# Patient Record
Sex: MALE | Race: WHITE | Employment: FULL TIME | URBAN - NONMETROPOLITAN AREA
[De-identification: names, ages, dates, MRNs, and addresses within clinical notes are randomized per-mention and may not be internally consistent; named-entity substitution may affect disease eponyms.]

---

## 2021-05-01 ENCOUNTER — HOSPITAL ENCOUNTER (EMERGENCY)
Age: 26
Discharge: HOME | End: 2021-05-01
Attending: EMERGENCY MEDICINE
Payer: COMMERCIAL

## 2021-05-01 VITALS
SYSTOLIC BLOOD PRESSURE: 127 MMHG | RESPIRATION RATE: 15 BRPM | DIASTOLIC BLOOD PRESSURE: 72 MMHG | TEMPERATURE: 98.3 F | HEART RATE: 86 BPM | OXYGEN SATURATION: 99 %

## 2021-05-01 DIAGNOSIS — R45.851 SUICIDE IDEATION: ICD-10-CM

## 2021-05-01 DIAGNOSIS — F10.929 ACUTE ALCOHOLIC INTOXICATION WITH COMPLICATION (HCC): Primary | ICD-10-CM

## 2021-05-01 LAB
ACETAMINOPHEN LEVEL: < 5 UG/ML (ref 0–20)
ALBUMIN SERPL-MCNC: 5.2 G/DL (ref 3.5–5.1)
ALP BLD-CCNC: 71 U/L (ref 38–126)
ALT SERPL-CCNC: 21 U/L (ref 11–66)
ANION GAP SERPL CALCULATED.3IONS-SCNC: 14 MEQ/L (ref 8–16)
AST SERPL-CCNC: 26 U/L (ref 5–40)
BASOPHILS # BLD: 0.4 %
BASOPHILS ABSOLUTE: 0 THOU/MM3 (ref 0–0.1)
BILIRUB SERPL-MCNC: 0.3 MG/DL (ref 0.3–1.2)
BUN BLDV-MCNC: 12 MG/DL (ref 7–22)
CALCIUM SERPL-MCNC: 8.9 MG/DL (ref 8.5–10.5)
CHLORIDE BLD-SCNC: 104 MEQ/L (ref 98–111)
CO2: 26 MEQ/L (ref 23–33)
CREAT SERPL-MCNC: 1 MG/DL (ref 0.4–1.2)
EOSINOPHIL # BLD: 0.3 %
EOSINOPHILS ABSOLUTE: 0 THOU/MM3 (ref 0–0.4)
ERYTHROCYTE [DISTWIDTH] IN BLOOD BY AUTOMATED COUNT: 12.7 % (ref 11.5–14.5)
ERYTHROCYTE [DISTWIDTH] IN BLOOD BY AUTOMATED COUNT: 43.2 FL (ref 35–45)
ETHYL ALCOHOL, SERUM: 0.04 %
ETHYL ALCOHOL, SERUM: 0.14 %
ETHYL ALCOHOL, SERUM: 0.34 %
GFR SERPL CREATININE-BSD FRML MDRD: > 90 ML/MIN/1.73M2
GLUCOSE BLD-MCNC: 130 MG/DL (ref 70–108)
HCT VFR BLD CALC: 44.8 % (ref 42–52)
HEMOGLOBIN: 15.1 GM/DL (ref 14–18)
IMMATURE GRANS (ABS): 0.03 THOU/MM3 (ref 0–0.07)
IMMATURE GRANULOCYTES: 0.3 %
LYMPHOCYTES # BLD: 36.2 %
LYMPHOCYTES ABSOLUTE: 3.4 THOU/MM3 (ref 1–4.8)
MCH RBC QN AUTO: 31.2 PG (ref 26–33)
MCHC RBC AUTO-ENTMCNC: 33.7 GM/DL (ref 32.2–35.5)
MCV RBC AUTO: 92.6 FL (ref 80–94)
MONOCYTES # BLD: 6.7 %
MONOCYTES ABSOLUTE: 0.6 THOU/MM3 (ref 0.4–1.3)
NUCLEATED RED BLOOD CELLS: 0 /100 WBC
OSMOLALITY CALCULATION: 288.3 MOSMOL/KG (ref 275–300)
PLATELET # BLD: 266 THOU/MM3 (ref 130–400)
PMV BLD AUTO: 11.2 FL (ref 9.4–12.4)
POTASSIUM REFLEX MAGNESIUM: 3.6 MEQ/L (ref 3.5–5.2)
RBC # BLD: 4.84 MILL/MM3 (ref 4.7–6.1)
SALICYLATE, SERUM: < 0.3 MG/DL (ref 2–10)
SEG NEUTROPHILS: 56.1 %
SEGMENTED NEUTROPHILS ABSOLUTE COUNT: 5.3 THOU/MM3 (ref 1.8–7.7)
SODIUM BLD-SCNC: 144 MEQ/L (ref 135–145)
TOTAL PROTEIN: 8.3 G/DL (ref 6.1–8)
TSH SERPL DL<=0.05 MIU/L-ACNC: 2.08 UIU/ML (ref 0.4–4.2)
WBC # BLD: 9.4 THOU/MM3 (ref 4.8–10.8)

## 2021-05-01 PROCEDURE — 36415 COLL VENOUS BLD VENIPUNCTURE: CPT

## 2021-05-01 PROCEDURE — 6360000002 HC RX W HCPCS

## 2021-05-01 PROCEDURE — 96372 THER/PROPH/DIAG INJ SC/IM: CPT

## 2021-05-01 PROCEDURE — 80143 DRUG ASSAY ACETAMINOPHEN: CPT

## 2021-05-01 PROCEDURE — 99285 EMERGENCY DEPT VISIT HI MDM: CPT

## 2021-05-01 PROCEDURE — 84443 ASSAY THYROID STIM HORMONE: CPT

## 2021-05-01 PROCEDURE — 82077 ASSAY SPEC XCP UR&BREATH IA: CPT

## 2021-05-01 PROCEDURE — 85025 COMPLETE CBC W/AUTO DIFF WBC: CPT

## 2021-05-01 PROCEDURE — 80179 DRUG ASSAY SALICYLATE: CPT

## 2021-05-01 PROCEDURE — 80053 COMPREHEN METABOLIC PANEL: CPT

## 2021-05-01 RX ORDER — LORAZEPAM 2 MG/ML
2 INJECTION INTRAMUSCULAR ONCE
Status: COMPLETED | OUTPATIENT
Start: 2021-05-01 | End: 2021-05-01

## 2021-05-01 RX ORDER — LORAZEPAM 2 MG/ML
INJECTION INTRAMUSCULAR
Status: COMPLETED
Start: 2021-05-01 | End: 2021-05-01

## 2021-05-01 RX ORDER — HALOPERIDOL 5 MG/ML
5 INJECTION INTRAMUSCULAR ONCE
Status: COMPLETED | OUTPATIENT
Start: 2021-05-01 | End: 2021-05-01

## 2021-05-01 RX ORDER — HALOPERIDOL 5 MG/ML
INJECTION INTRAMUSCULAR
Status: COMPLETED
Start: 2021-05-01 | End: 2021-05-01

## 2021-05-01 RX ADMIN — LORAZEPAM 2 MG: 2 INJECTION INTRAMUSCULAR; INTRAVENOUS at 01:05

## 2021-05-01 RX ADMIN — HALOPERIDOL 5 MG: 5 INJECTION INTRAMUSCULAR at 01:05

## 2021-05-01 RX ADMIN — LORAZEPAM 2 MG: 2 INJECTION INTRAMUSCULAR at 01:05

## 2021-05-01 RX ADMIN — HALOPERIDOL LACTATE 5 MG: 5 INJECTION INTRAMUSCULAR at 01:05

## 2021-05-01 ASSESSMENT — ENCOUNTER SYMPTOMS
WHEEZING: 0
ABDOMINAL PAIN: 0
CHEST TIGHTNESS: 0
COUGH: 0
EYE PAIN: 0
SORE THROAT: 0
EYE DISCHARGE: 0
NAUSEA: 0
RHINORRHEA: 0
SHORTNESS OF BREATH: 0
PHOTOPHOBIA: 0
STRIDOR: 0
BACK PAIN: 0
EYE REDNESS: 0
VOMITING: 0
ABDOMINAL DISTENTION: 0
EYE ITCHING: 0
DIARRHEA: 0
CONSTIPATION: 0

## 2021-05-01 NOTE — ED NOTES
Pt resting in bed with eyes closed, respirations unlabored. Call light in reach.      Scott Zhao RN  05/01/21 3414

## 2021-05-01 NOTE — PROGRESS NOTES
BAL Re-Assess:   Status & Exam & Behavior Support Service Tabs      Present Suicidal Behavior:      Verbal: Denies    Plan: Denies    Current Suicide Risk: Low, Moderate or High: Low    Present Homicidal Behavior:    Verbal: Denies    Plan: Denies      Psychosis:    Hallucinations:Denies    Delusions:Denies      Clinical Re-Assessment Summary including Current Mental State of Patient: Pt reports he was feeling a little depressed and drank to much alcohol. Pt reports because I decided to drink to much it caused all of this. Pt reports this stint taught him a lesson and he will not being doing that again.            Updated Level of Care Disposition:    Consulted with Dr. Fran Singh Discharge pt

## 2021-05-01 NOTE — ED TRIAGE NOTES
Patient to ED from house sitting by 's after sending a text to a coworker stating that he wanted to put a bullet in his head. Patient was house sitting a home when he drank a bottle of whisky. Patient denies suicidal thoughts. Pt under [facility] by police.

## 2021-05-01 NOTE — ED NOTES
Pt resting in bed in a supine position at this time. Patient respirations easy and unlabored.  Pt side rails up x2     Roseanne Villela RN  05/01/21 2719

## 2021-05-01 NOTE — ED NOTES
Pt resting in bed in a semi fowlers position. Patient denies needs at this time. Patient respirations easy and unlabored.       Robert Chow RN  05/01/21 6548

## 2021-05-01 NOTE — ED NOTES
Upon first contact pt is resting on cot. Lights off for comfort. [place] police providing continuous monitoring.       Miladis Lam RN  05/01/21 0213

## 2021-05-01 NOTE — ED PROVIDER NOTES
[address] ENCOUNTER      PATIENT NAME: Jose Miranda  MRN: [id number]  : 1995  SPANN: 2021  PROVIDER: iVktoria West MD      CHIEF COMPLAINT       Chief Complaint   Patient presents with    Alcohol Intoxication    Suicidal       Nurses Notes reviewed and I agree except as noted in the HPI. HISTORY OF PRESENT ILLNESS    Jose Miranda is a 32 y.o. male who presents to Emergency Department with Alcohol Intoxication and Suicidal    Patient presents to ED with alcohol intoxication and suicide ideation. Patient sent text his friend that he wanted to put a bullet in his head. Patient was agitated and noncooperative. He had to be medicated to calm him down. He was brought in by [facility], he was reported to have a bottle of whiskey tonight. He currently lynn suicide plan. Under [facility] by police. This HPI was provided by the police and nurse. REVIEW OF SYSTEMS     Review of Systems   Constitutional: Negative for activity change, appetite change, chills, fatigue, fever and unexpected weight change. HENT: Negative for congestion, ear discharge, ear pain, hearing loss, nosebleeds, rhinorrhea and sore throat. Eyes: Negative for photophobia, pain, discharge, redness and itching. Respiratory: Negative for cough, chest tightness, shortness of breath, wheezing and stridor. Cardiovascular: Negative for chest pain, palpitations and leg swelling. Gastrointestinal: Negative for abdominal distention, abdominal pain, constipation, diarrhea, nausea and vomiting. Endocrine: Negative for cold intolerance, heat intolerance, polydipsia and polyphagia. Genitourinary: Negative for dysuria, flank pain, frequency and hematuria. Musculoskeletal: Negative for arthralgias, back pain, gait problem, myalgias, neck pain and neck stiffness. Skin: Negative for pallor, rash and wound.    Allergic/Immunologic: Negative for environmental allergies and food allergies. Neurological: Negative for dizziness, tremors, syncope, weakness and headaches. Psychiatric/Behavioral: Positive for agitation, dysphoric mood and suicidal ideas. Negative for behavioral problems, confusion, self-injury and sleep disturbance. The patient is nervous/anxious and is hyperactive. PAST MEDICAL HISTORY   History reviewed. No pertinent past medical history. SURGICAL HISTORY     History reviewed. No pertinent surgical history. CURRENT MEDICATIONS       Previous Medications    No medications on file       ALLERGIES     Patient has no known allergies. FAMILY HISTORY     has no family status information on file. family history is not on file. SOCIAL HISTORY      reports current alcohol use. PHYSICAL EXAM     INITIAL VITALS:    temperature is 98.3 °F (36.8 °C). His blood pressure is 140/78 (abnormal) and his pulse is 110. His respiration is 16 and oxygen saturation is 98%. Physical Exam  Vitals signs and nursing note reviewed. Constitutional:       Appearance: He is well-developed. He is not diaphoretic. HENT:      Head: Normocephalic and atraumatic. Nose: Nose normal.   Eyes:      General: No scleral icterus. Right eye: No discharge. Left eye: No discharge. Conjunctiva/sclera: Conjunctivae normal.      Pupils: Pupils are equal, round, and reactive to light. Neck:      Musculoskeletal: Normal range of motion and neck supple. Vascular: No JVD. Trachea: No tracheal deviation. Cardiovascular:      Rate and Rhythm: Regular rhythm. Tachycardia present. Heart sounds: Normal heart sounds. No murmur. No friction rub. No gallop. Pulmonary:      Effort: Pulmonary effort is normal. No respiratory distress. Breath sounds: Normal breath sounds. No stridor. No wheezing or rales. Chest:      Chest wall: No tenderness. Abdominal:      General: Bowel sounds are normal. There is no distension. Palpations: Abdomen is soft. There is no mass. Tenderness: There is no abdominal tenderness. There is no guarding or rebound. Hernia: No hernia is present. Musculoskeletal:         General: No tenderness or deformity. Lymphadenopathy:      Cervical: No cervical adenopathy. Skin:     General: Skin is warm and dry. Capillary Refill: Capillary refill takes less than 2 seconds. Coloration: Skin is not pale. Findings: No erythema or rash. Neurological:      Mental Status: He is alert and oriented to person, place, and time. Cranial Nerves: No cranial nerve deficit. Sensory: No sensory deficit. Motor: No abnormal muscle tone. Coordination: Coordination normal.      Deep Tendon Reflexes: Reflexes normal.         MEDICAL DEDISION MAKINGS:   1:02 AM: Patient is seen and evaluated in a timely fashion. ED nurse's documentations are reviewed. DIFFERENTIAL DIAGNOSIS:  Anxiety, depression, alcohol intoxication, substance abuse, substance-induced mood disorder    EKG:    Interpreted by ED physician  None    LAB RESULTS:  Results for orders placed or performed during the hospital encounter of 05/01/21   CBC auto differential   Result Value Ref Range    WBC 9.4 4.8 - 10.8 thou/mm3    RBC 4.84 4.70 - 6.10 mill/mm3    Hemoglobin 15.1 14.0 - 18.0 gm/dl    Hematocrit 44.8 42.0 - 52.0 %    MCV 92.6 80.0 - 94.0 fL    MCH 31.2 26.0 - 33.0 pg    MCHC 33.7 32.2 - 35.5 gm/dl    RDW-CV 12.7 11.5 - 14.5 %    RDW-SD 43.2 35.0 - 45.0 fL    Platelets 739 629 - 338 thou/mm3    MPV 11.2 9.4 - 12.4 fL    Seg Neutrophils 56.1 %    Lymphocytes 36.2 %    Monocytes 6.7 %    Eosinophils 0.3 %    Basophils 0.4 %    Immature Granulocytes 0.3 %    Segs Absolute 5.3 1 - 7 thou/mm3    Lymphocytes Absolute 3.4 1.0 - 4.8 thou/mm3    Monocytes Absolute 0.6 0.4 - 1.3 thou/mm3    Eosinophils Absolute 0.0 0.0 - 0.4 thou/mm3    Basophils Absolute 0.0 0.0 - 0.1 thou/mm3    Immature Grans (Abs) 0.03 0.00 - 0.07 thou/mm3    nRBC 0 /100 wbc Comprehensive Metabolic Panel w/ Reflex to MG   Result Value Ref Range    Glucose 130 (H) 70 - 108 mg/dL    CREATININE 1.0 0.4 - 1.2 mg/dL    BUN 12 7 - 22 mg/dL    Sodium 144 135 - 145 meq/L    Potassium reflex Magnesium 3.6 3.5 - 5.2 meq/L    Chloride 104 98 - 111 meq/L    CO2 26 23 - 33 meq/L    Calcium 8.9 8.5 - 10.5 mg/dL    AST 26 5 - 40 U/L    Alkaline Phosphatase 71 38 - 126 U/L    Total Protein 8.3 (H) 6.1 - 8.0 g/dL    Albumin 5.2 (H) 3.5 - 5.1 g/dL    Total Bilirubin 0.3 0.3 - 1.2 mg/dL    ALT 21 11 - 66 U/L   Ethanol   Result Value Ref Range    ETHYL ALCOHOL, SERUM 4.81 4.32 %   Salicylate Level   Result Value Ref Range    Salicylate, Serum < 0.3 (L) 2.0 - 10.0 mg/dL   Acetaminophen level   Result Value Ref Range    Acetaminophen Level < 5.0 0.0 - 20.0 ug/mL   TSH with Reflex   Result Value Ref Range    TSH 2.080 0.400 - 4.200 uIU/mL   Anion Gap   Result Value Ref Range    Anion Gap 14.0 8.0 - 16.0 meq/L   Glomerular Filtration Rate, Estimated   Result Value Ref Range    Est, Glom Filt Rate >90 ml/min/1.73m2   Osmolality   Result Value Ref Range    Osmolality Calc 288. 3 275.0 - 300.0 mOsmol/kg       RADIOLOGY  No orders to display       RATIONALE:    Medications   LORazepam (ATIVAN) injection 2 mg (2 mg Intramuscular Given 5/1/21 0105)   haloperidol lactate (HALDOL) injection 5 mg (5 mg Intramuscular Given 5/1/21 0105)       Labs Ordered:    CBC WITH AUTO DIFFERENTIAL   COMPREHENSIVE METABOLIC PANEL W/ REFLEX TO MG FOR LOW K   ETHANOL   URINE RT REFLEX TO CULTURE   URINE DRUG SCREEN   SALICYLATE LEVEL   ACETAMINOPHEN LEVEL   TSH WITH REFLEX     ETOH recheck at 11 AM.      Signed out to next shift physician Dr. Grace Hunter. CRITICAL CARE:   None    CONSULTS:  NOEMI    PROCEDURES:  None    RE-EXAMINATION AND RE-EVALUATION   Stable and feeling better.      VITALS IN ED  Vitals:    05/01/21 0035 05/01/21 0218 05/01/21 0522   BP: (!) 140/78     Pulse: 110     Resp: 22 18 16   Temp: 98.3 °F (36.8 °C)     SpO2: 98%         FINAL IMPRESSION      1. Acute alcoholic intoxication with complication ([facility] [address]    2. Suicide ideation          DISPOSITION/PLAN   Signed out. PATIENT REFERRED TO:  No follow-up provider specified.     DISCHARGE MEDICATIONS:  New Prescriptions    No medications on file       (Please note that portions of this note were completed with a voice recognition program.  Efforts were made to edit the dictations but occasionally words aremis-transcribed.)    MD Kathy Zhou MD  05/01/21 2427

## 2021-05-01 NOTE — ED NOTES
Bed: 021A  Expected date:   Expected time:   Means of arrival:   Comments:  crisis     Naldo Alejandro RN  05/01/21 7024

## 2021-05-01 NOTE — PROGRESS NOTES
7:00am Clinician was provided handoff. Pt was resting in bed with covers on.     8:50am Pt resting in bed with covers on     9:10am RN is in the room and woke pt up to complete vitals.      10:35am Pt is resting in bed with covers on.     11:34am Pt was provided a safe tray by RN

## 2021-05-01 NOTE — PROGRESS NOTES
Provisional Diagnosis:    ALIDA    Risk, Psychosocial and Contextual Factors:  ALIDA    Current MH Treatment: ALIDA      Present Suicidal Behavior:      Verbal:  ALIDA        Attempt: ALIDA    Access to Weapons:  ALIDA    Current Suicide Risk: Low, Moderate or High:    High (EMC)    Past Suicidal Behavior:        Verbal:   ALIDA    Attempt: ALIDA    Self-Injurious/Self-Mutilation: ALIDA    Traumatic Event Within Past 2 Weeks:   ALIDA    Current Abuse: ALIDA    Legal:  ALIDA    Violence: ALIDA    Protective Factors:  ALIDA    Housing:    ALIDA    CPAP/Oxygen/Ambulation Difficulties:     Basic Vital Signs Normal?: Check with Patients Nurse prior to Calling Psychiatry    Critical Labs?: Check with Patients Nurse prior to Calling Psychiatry    Clinical Summary:      Patient is a 32year old male who presents to the ED on [facility] via reporting. Per [facility], deputies were dispatched for a wellness check on patient due to patient text messaging friend that he 'wants to piut a bullet in his head'. Deputies arrived on scene and patient was extremely drunk on whiskey and gave physical que that he was in a manic drunk state. Patient was all over with his statements and would not cooperate. Patient did yell at deputies that he wants us to kill him. It is my belief that patient would benefit from mental help and medical help for alcohol. Patient ALIDA assess at this time. Per nurse note, upon arrival patient acting aggressively trying to exit safe room and yelling at police. Patient was medicated shortly after arrival. When this clinician attempted to assess patient, patient would not respond to questions. Patient sleeping at this time. Level of Care Disposition:      Patient BAL . 34. Patient to be re-assessed once clinically sober and alert and oriented.

## 2021-05-01 NOTE — ED NOTES
[phone] Perez- Father. Pt gave verbal consent to updated father on plan of care.       Pedro Varela RN  05/01/21 CIELO Santos  05/01/21 3712

## 2021-05-01 NOTE — ED PROVIDER NOTES
Physician Note:    Patient was seen by Dr. Padma Vasquez initially, signed out to Dr. Morgan Montez and then to me as the patient had a pending evaluation and alcohol level. I have personally performed and participated in the history, exam and medical decision making and agree with all pertinent clinical information. I have also reviewed and agree with the past medical, family and social history unless otherwise noted. Patient presented to the emergency room due to alcohol intoxication and suicidal ideation. Patient came in here last night was evaluated, alcohol has been redrawn from time to time and was monitored. When I am seeing the patient the patient's alcohol level is 0.04, denies any suicidal ideation no homicidal ideation, no delusion or hallucination, she is feeling well, no shortness of breath or chest pain abdominal pain back pain and wants to go home. Patient was evaluated by the behavioral access team and was cleared to go home and be followed as an outpatient    Physical examination showed her to auscultation, heart regular rate and rhythm abdomen soft depressible nontender extremities no edema    Evaluation: Agree above , seen the patient and discussed the diagnosis and treatment plans     FINAL IMPRESSION       1. Acute alcoholic intoxication with complication ([facility] [address]    2. Suicide ideation            DISPOSITION/PLAN  PATIENT REFERRED TO:  [address] [place] [address],[address]  [address] [address] [facility] [address] [facility]  Schedule an appointment as soon as possible for a visit in 2 days      DISCHARGE MEDICATIONS:  New Prescriptions    No medications on file         Car Jennings MD      Emergency room physician     Car Jennings MD  05/05/21 2360

## 2021-05-04 ENCOUNTER — OFFICE VISIT (OUTPATIENT)
Dept: FAMILY MEDICINE CLINIC | Age: 26
End: 2021-05-04
Payer: COMMERCIAL

## 2021-05-04 VITALS
OXYGEN SATURATION: 98 % | HEART RATE: 130 BPM | WEIGHT: 157.4 LBS | HEIGHT: 68 IN | SYSTOLIC BLOOD PRESSURE: 120 MMHG | BODY MASS INDEX: 23.86 KG/M2 | TEMPERATURE: 98.2 F | DIASTOLIC BLOOD PRESSURE: 86 MMHG

## 2021-05-04 DIAGNOSIS — F41.9 ANXIETY: Primary | ICD-10-CM

## 2021-05-04 DIAGNOSIS — R00.0 TACHYCARDIA: ICD-10-CM

## 2021-05-04 DIAGNOSIS — F90.2 ATTENTION DEFICIT HYPERACTIVITY DISORDER (ADHD), COMBINED TYPE: ICD-10-CM

## 2021-05-04 PROCEDURE — 99213 OFFICE O/P EST LOW 20 MIN: CPT | Performed by: STUDENT IN AN ORGANIZED HEALTH CARE EDUCATION/TRAINING PROGRAM

## 2021-05-04 RX ORDER — BUPROPION HYDROCHLORIDE 150 MG/1
150 TABLET ORAL EVERY MORNING
Qty: 30 TABLET | Refills: 3 | Status: SHIPPED | OUTPATIENT
Start: 2021-05-04

## 2021-05-04 RX ORDER — DEXTROAMPHETAMINE SACCHARATE, AMPHETAMINE ASPARTATE, DEXTROAMPHETAMINE SULFATE AND AMPHETAMINE SULFATE 2.5; 2.5; 2.5; 2.5 MG/1; MG/1; MG/1; MG/1
TABLET ORAL
COMMUNITY
Start: 2021-04-07

## 2021-05-04 RX ORDER — DEXTROAMPHETAMINE SACCHARATE, AMPHETAMINE ASPARTATE MONOHYDRATE, DEXTROAMPHETAMINE SULFATE AND AMPHETAMINE SULFATE 5; 5; 5; 5 MG/1; MG/1; MG/1; MG/1
CAPSULE, EXTENDED RELEASE ORAL
COMMUNITY
Start: 2021-04-07

## 2021-05-04 SDOH — ECONOMIC STABILITY: FOOD INSECURITY: WITHIN THE PAST 12 MONTHS, THE FOOD YOU BOUGHT JUST DIDN'T LAST AND YOU DIDN'T HAVE MONEY TO GET MORE.: NEVER TRUE

## 2021-05-04 SDOH — HEALTH STABILITY: MENTAL HEALTH: HOW MANY STANDARD DRINKS CONTAINING ALCOHOL DO YOU HAVE ON A TYPICAL DAY?: NOT ASKED

## 2021-05-04 SDOH — ECONOMIC STABILITY: INCOME INSECURITY: HOW HARD IS IT FOR YOU TO PAY FOR THE VERY BASICS LIKE FOOD, HOUSING, MEDICAL CARE, AND HEATING?: NOT HARD AT ALL

## 2021-05-04 SDOH — ECONOMIC STABILITY: FOOD INSECURITY: WITHIN THE PAST 12 MONTHS, YOU WORRIED THAT YOUR FOOD WOULD RUN OUT BEFORE YOU GOT MONEY TO BUY MORE.: NEVER TRUE

## 2021-05-04 SDOH — ECONOMIC STABILITY: TRANSPORTATION INSECURITY
IN THE PAST 12 MONTHS, HAS LACK OF TRANSPORTATION KEPT YOU FROM MEETINGS, WORK, OR FROM GETTING THINGS NEEDED FOR DAILY LIVING?: NO

## 2021-05-04 SDOH — HEALTH STABILITY: MENTAL HEALTH: HOW OFTEN DO YOU HAVE A DRINK CONTAINING ALCOHOL?: NOT ASKED

## 2021-05-04 ASSESSMENT — ENCOUNTER SYMPTOMS
ABDOMINAL PAIN: 0
EYE PAIN: 0
SHORTNESS OF BREATH: 0
DIARRHEA: 0
TROUBLE SWALLOWING: 0
BLOOD IN STOOL: 0
COUGH: 0
CONSTIPATION: 0

## 2021-05-04 ASSESSMENT — PATIENT HEALTH QUESTIONNAIRE - PHQ9
SUM OF ALL RESPONSES TO PHQ QUESTIONS 1-9: 1
SUM OF ALL RESPONSES TO PHQ9 QUESTIONS 1 & 2: 1
1. LITTLE INTEREST OR PLEASURE IN DOING THINGS: 0
SUM OF ALL RESPONSES TO PHQ QUESTIONS 1-9: 1

## 2021-05-04 NOTE — PROGRESS NOTES
Health Maintenance Due   Topic Date Due    Hepatitis C screen  Never done Declined    Varicella vaccine (1 of 2 - 2-dose childhood series) Never done Declined    HPV vaccine (1 - Male 2-dose series) Never done Declined    HIV screen  Never done Declined    COVID-19 Vaccine (1) Never done Had 1st one    DTaP/Tdap/Td vaccine (1 - Tdap) Never done Declined

## 2021-05-04 NOTE — PROGRESS NOTES
Leonel Montejo is a 32 y.o. male who presents today for:  Chief Complaint   Patient presents with   [facility] ED Follow-up     [facility] ED 05/01/2021 Follow-up Acute Alcohol Intoxication and Suicidal Ideation     HPI:   59-year-old male with past medical history of ADHD on Adderall who is here for ED follow-up after acute alcohol intoxication. States that he has increased his alcohol intake since the pandemic started, has never had an issue with abuse of alcohol. Has never been unable to stop drinking. However, states that on this occasion he was unable to stop. He was evaluated in the ED, etoh level normalized, and he returned home. Reports stressors from work, and he moved back home with his parents in March of 2020. Denies any suicidal ideation. Denies history of depression, however expresses depressed mood since this event with accompanied feelings of guilt/shame. Does state that he has a history of anxiety. He is requesting a note stating that it is okay for him to go back to work. He has a counselor in [facility].      Objective:     Vitals:    05/04/21 1057 05/04/21 1106   BP: (!) 134/96 120/86   Site: Left Upper Arm Left Upper Arm   Position: Sitting Sitting   Cuff Size: Medium Adult Medium Adult   Pulse: 130    Temp: 98.2 °F (36.8 °C)    TempSrc: Oral    SpO2: 98%    Weight: 157 lb 6.4 oz (71.4 kg)    Height: 5' 8\" (1.727 m)        Lab Results   Component Value Date    WBC 9.4 05/01/2021    HGB 15.1 05/01/2021    HCT 44.8 05/01/2021    MCV 92.6 05/01/2021     05/01/2021     Lab Results   Component Value Date     05/01/2021    K 3.6 05/01/2021     05/01/2021    CO2 26 05/01/2021    BUN 12 05/01/2021    CREATININE 1.0 05/01/2021    GLUCOSE 130 (H) 05/01/2021    CALCIUM 8.9 05/01/2021    PROT 8.3 (H) 05/01/2021    LABALBU 5.2 (H) 05/01/2021    BILITOT 0.3 05/01/2021    ALKPHOS 71 05/01/2021    AST 26 05/01/2021    ALT 21 05/01/2021    LABGLOM >90 05/01/2021         Lab Results   Component Value and oriented to person, place, and time. Psychiatric:         Attention and Perception: Attention normal.         Mood and Affect: Mood is anxious. Behavior: Behavior normal.         Thought Content: Thought content normal.         Judgment: Judgment normal.         Social Needs   Food insecurity    Worry: Never true    Inability: Never true       Assessment / Plan:      Diagnosis Orders   1. Anxiety  buPROPion (WELLBUTRIN XL) 150 MG extended release tablet   2. Attention deficit hyperactivity disorder (ADHD), combined type     3. Tachycardia       Counseling regarding alcohol consumption. Begin Wellbutrin 150 mg extended release. Counseled that this may paradoxically increase his anxiety. Informed him to call us if this occurs. Counseled that wellbutrin and etoh will lower the seizure threshold, okay for 1-2 drinks occasionally. He states that he will not be drinking for a long while. Monitor heart rate at home, if persistently elevated greater than 100, call the office. This may be due to his Adderall versus anxiety in the office today. He was asymptomatic. Recheck at next visit. Letter for work stating that he may return tomorrow  Follow-up with counseling  Follow-up in the office in 4 to 6 weeks. Return in about 4 weeks (around 6/1/2021) for F/u mood / work . Medications Prescribed:  Orders Placed This Encounter   Medications    buPROPion (WELLBUTRIN XL) 150 MG extended release tablet     Sig: Take 1 tablet by mouth every morning     Dispense:  30 tablet     Refill:  3       Future Appointments   Date Time Provider Jorge Blackwell   6/3/2021  9:40 AM Debra Gold,  [address]       Patient given educational materials - see patient instructions. Discussed use, benefit, and sideeffects of prescribed medications. All patient questions answered. Pt voiced understanding. Reviewed health maintenance.   Instructed to continue current medications, diet and exercise. Patient agreed with treatment plan. Follow up as directed.      Electronically signed by Eleonora Dockery DO on 5/4/2021 at 12:59 PM

## 2021-05-04 NOTE — LETTER
[address] [address],[address] [facility] [address]  [facility] [address]  Phone: [phone]  Fax: [address], [place]        May 4, 2021     Patient: Wing Santoyo   YOB: 1995   Date of Visit: 5/4/2021       To Whom It May Concern: It is my medical opinion that Wing Santoyo may return to full duty immediately with no restrictions on 5/5/2021. If you have any questions or concerns, please don't hesitate to call.     Sincerely,        Tierrae Lara, DO

## 2021-05-04 NOTE — PROGRESS NOTES
SUBJECTIVE     Jacinto Ennis is a 32 y.o.male    Chief Complaint   Patient presents with   [facility] ED Follow-up     [facility] ED 05/01/2021 Follow-up Acute Alcohol Intoxication and Suicidal Ideation       Chief complaint, [place], and all pertinent details of the case reviewed with the resident. Please see resident's note for specific details discussed at today's visit. Patient Active Problem List   Diagnosis    ADHD (attention deficit hyperactivity disorder)       Current Outpatient Medications   Medication Sig Dispense Refill    amphetamine-dextroamphetamine (ADDERALL XR) 20 MG extended release capsule TAKE 1 CAPSULE BY MOUTH EVERY MORNING AS DIRECTED      amphetamine-dextroamphetamine (ADDERALL) 10 MG tablet TAKE 1 TABLET BY MOUTH ONCE DAILY EVERY DAY AS NEEDED. DO NOT TAKE LATER THAN 4 PM      buPROPion (WELLBUTRIN XL) 150 MG extended release tablet Take 1 tablet by mouth every morning 30 tablet 3     No current facility-administered medications for this visit. Review of Systems per Dr. Ivelisse Evans    OBJECTIVE     /86 (Site: Left Upper Arm, Position: Sitting, Cuff Size: Medium Adult)   Pulse 130   Temp 98.2 °F (36.8 °C) (Oral)   Ht 5' 8\" (1.727 m)   Wt 157 lb 6.4 oz (71.4 kg)   SpO2 98%   BMI 23.93 kg/m²   BP Readings from Last 3 Encounters:   05/04/21 120/86   05/01/21 127/72       Wt Readings from Last 3 Encounters:   05/04/21 157 lb 6.4 oz (71.4 kg)     Body mass index is 23.93 kg/m².     Physical Exam per Dr. Ivelisse Evans      Lab Results   Component Value Date     05/01/2021    K 3.6 05/01/2021     05/01/2021    CO2 26 05/01/2021    BUN 12 05/01/2021    CREATININE 1.0 05/01/2021    GLUCOSE 130 (H) 05/01/2021    CALCIUM 8.9 05/01/2021    PROT 8.3 (H) 05/01/2021    LABALBU 5.2 (H) 05/01/2021    BILITOT 0.3 05/01/2021    ALKPHOS 71 05/01/2021    AST 26 05/01/2021    ALT 21 05/01/2021    LABGLOM >90 05/01/2021       Lab Results   Component Value Date    TSH 2.080 05/01/2021       Lab Results   Component Value Date    WBC 9.4 05/01/2021    HGB 15.1 05/01/2021    HCT 44.8 05/01/2021    MCV 92.6 05/01/2021     05/01/2021           Immunization History   Administered Date(s) Administered    COVID-19, Pfizer, PF, 30mcg/0.3mL 04/03/2021       Health Maintenance   Topic Date Due    Hepatitis C screen  Never done    Varicella vaccine (1 of 2 - 2-dose childhood series) Never done    HPV vaccine (1 - Male 2-dose series) Never done    HIV screen  Never done    DTaP/Tdap/Td vaccine (1 - Tdap) Never done    COVID-19 Vaccine (2 - Pfizer 2-dose series) 04/24/2021    Flu vaccine (Season Ended) 09/01/2021    Hepatitis A vaccine  Aged Out    Hepatitis B vaccine  Aged Out    Hib vaccine  Aged Out    Meningococcal (ACWY) vaccine  Aged Out    Pneumococcal 0-64 years Vaccine  Aged Out       Future Appointments   Date Time Provider Jorge Blackwell   6/3/2021  9:40 AM Debra Gold DO [facility] [address] [facility]       ASSESSMENT       Diagnosis Orders   1. Anxiety  buPROPion (WELLBUTRIN XL) 150 MG extended release tablet   2. Attention deficit hyperactivity disorder (ADHD), combined type     3. Tachycardia         PLAN      After discussion with Dr. West Marmolejo, we agreed on plan as follows:    1. Follow up with counselor  2. Work note given  3. Start wellbutrin for mood  Counseled on ETOH limitation  Follow up in 4-6 wks  4. Monitor HR at home and w/ next visit. May need lowering of stimulant if tachy continues        Attending Physician Statement  I have discussed the case, including pertinent history and exam findings with the resident. I agree with the documented assessment and plan as documented by the resident.   GE modifier added  to this encounter     Electronically signed by Rubi Olmedo MD on 5/4/2021 at 1:09 PM